# Patient Record
Sex: MALE | Race: WHITE | NOT HISPANIC OR LATINO | Employment: STUDENT | URBAN - METROPOLITAN AREA
[De-identification: names, ages, dates, MRNs, and addresses within clinical notes are randomized per-mention and may not be internally consistent; named-entity substitution may affect disease eponyms.]

---

## 2018-11-01 DIAGNOSIS — B07.9 VIRAL WARTS, UNSPECIFIED TYPE: Primary | ICD-10-CM

## 2024-12-21 ENCOUNTER — HOSPITAL ENCOUNTER (EMERGENCY)
Facility: HOSPITAL | Age: 29
Discharge: HOME | End: 2024-12-21
Attending: EMERGENCY MEDICINE
Payer: COMMERCIAL

## 2024-12-21 VITALS
OXYGEN SATURATION: 98 % | SYSTOLIC BLOOD PRESSURE: 177 MMHG | TEMPERATURE: 100 F | DIASTOLIC BLOOD PRESSURE: 91 MMHG | WEIGHT: 203.69 LBS | BODY MASS INDEX: 28.52 KG/M2 | RESPIRATION RATE: 16 BRPM | HEART RATE: 88 BPM | HEIGHT: 71 IN

## 2024-12-21 DIAGNOSIS — T14.8XXA PUNCTURE WOUND: Primary | ICD-10-CM

## 2024-12-21 DIAGNOSIS — M79.5 FOREIGN BODY (FB) IN SOFT TISSUE: ICD-10-CM

## 2024-12-21 PROCEDURE — 99284 EMERGENCY DEPT VISIT MOD MDM: CPT | Mod: 25

## 2024-12-21 PROCEDURE — 90471 IMMUNIZATION ADMIN: CPT | Performed by: NURSE PRACTITIONER

## 2024-12-21 PROCEDURE — 90715 TDAP VACCINE 7 YRS/> IM: CPT | Performed by: NURSE PRACTITIONER

## 2024-12-21 PROCEDURE — 63600175 PHARM REV CODE 636 W HCPCS: Performed by: NURSE PRACTITIONER

## 2024-12-21 RX ORDER — CEPHALEXIN 500 MG/1
500 CAPSULE ORAL 4 TIMES DAILY
Qty: 20 CAPSULE | Refills: 0 | Status: SHIPPED | OUTPATIENT
Start: 2024-12-21 | End: 2024-12-26

## 2024-12-21 RX ORDER — LIDOCAINE HYDROCHLORIDE 10 MG/ML
10 INJECTION, SOLUTION EPIDURAL; INFILTRATION; INTRACAUDAL; PERINEURAL
Status: COMPLETED | OUTPATIENT
Start: 2024-12-21 | End: 2024-12-21

## 2024-12-21 RX ADMIN — LIDOCAINE HYDROCHLORIDE 100 MG: 10 SOLUTION INTRAVENOUS at 03:12

## 2024-12-21 RX ADMIN — CLOSTRIDIUM TETANI TOXOID ANTIGEN (FORMALDEHYDE INACTIVATED), CORYNEBACTERIUM DIPHTHERIAE TOXOID ANTIGEN (FORMALDEHYDE INACTIVATED), BORDETELLA PERTUSSIS TOXOID ANTIGEN (GLUTARALDEHYDE INACTIVATED), BORDETELLA PERTUSSIS FILAMENTOUS HEMAGGLUTININ ANTIGEN (FORMALDEHYDE INACTIVATED), BORDETELLA PERTUSSIS PERTACTIN ANTIGEN, AND BORDETELLA PERTUSSIS FIMBRIAE 2/3 ANTIGEN 0.5 ML: 5; 2; 2.5; 5; 3; 5 INJECTION, SUSPENSION INTRAMUSCULAR at 04:12

## 2024-12-22 NOTE — ED PROVIDER NOTES
Encounter Date: 12/21/2024       History     Chief Complaint   Patient presents with    Foreign Body in Skin     Pt was at work moving wood and got a large piece of wood in right hand today.     29-year-old male presents the emergency department for a foreign body noted to the right hand.  Patient reports he was working with a piece of plywood when a piece of splinter came off and punctured to the skin of his right hand.  Last Tdap is unknown.  Denies any fever, chills, chest pain, shortness of breath, back pain, abdominal pain, nausea, vomiting, and all other concerns at this time.    The history is provided by the patient. No  was used.     Review of patient's allergies indicates:  No Known Allergies  History reviewed. No pertinent past medical history.  Past Surgical History:   Procedure Laterality Date    APPENDECTOMY  10/2010     Family History   Problem Relation Name Age of Onset    Diabetes Maternal Grandmother      Diabetes Paternal Grandmother      Breast cancer Paternal Grandmother      Allergies Paternal Grandmother      Diabetes Paternal Grandfather      Myasthenia gravis Maternal Grandfather      Allergies Father      Allergies Brother      Asthma Maternal Uncle      Eczema Paternal Aunt      Allergic rhinitis Neg Hx      Angioedema Neg Hx      Atopy Neg Hx      Immunodeficiency Neg Hx      Rhinitis Neg Hx      Urticaria Neg Hx       Social History     Tobacco Use    Smoking status: Never     Review of Systems   Constitutional:  Negative for fever.   HENT:  Negative for sore throat.    Respiratory:  Negative for shortness of breath.    Cardiovascular:  Negative for chest pain.   Gastrointestinal:  Negative for abdominal pain, nausea and vomiting.   Genitourinary:  Negative for dysuria.   Musculoskeletal:  Negative for back pain.   Skin:  Positive for wound. Negative for rash.   Neurological:  Negative for weakness.   Hematological:  Does not bruise/bleed easily.       Physical Exam  "    Initial Vitals [12/21/24 1440]   BP Pulse Resp Temp SpO2   (!) 177/91 88 16 99.7 °F (37.6 °C) 98 %      MAP       --         Physical Exam    Nursing note and vitals reviewed.  Constitutional: He appears well-developed and well-nourished. He is not diaphoretic. No distress.   HENT:   Head: Normocephalic and atraumatic.   Eyes: Right eye exhibits no discharge. Left eye exhibits no discharge.   Neck: Neck supple.   Normal range of motion.  Cardiovascular:  Normal rate.           Pulmonary/Chest: No respiratory distress.   Abdominal: He exhibits no distension.   Musculoskeletal:         General: Normal range of motion.      Cervical back: Normal range of motion and neck supple.     Neurological: He is alert and oriented to person, place, and time. He has normal strength.   Skin: Skin is warm and dry.   Pieces splintered would noted through the proximal palmar aspect of the right hand just distal to the wrist.   Psychiatric: He has a normal mood and affect. His behavior is normal. Thought content normal.         ED Course   Foreign Body    Date/Time: 12/21/2024 5:37 PM    Performed by: Ethan Champion NP  Authorized by: Ethan Champion NP  Consent Done: Yes  Consent: Verbal consent obtained. Written consent not obtained.  Risks and benefits: risks, benefits and alternatives were discussed  Consent given by: patient  Patient understanding: patient states understanding of the procedure being performed  Patient consent: the patient's understanding of the procedure matches consent given  Imaging studies: imaging studies available  Patient identity confirmed: name  Time out: Immediately prior to procedure a "time out" was called to verify the correct patient, procedure, equipment, support staff and site/side marked as required.  Intake: Right hand.  Anesthesia: local infiltration    Anesthesia:  Local Anesthetic: lidocaine 1% without epinephrine  Anesthetic total (ml): 5.    Patient sedated: no  Patient " cooperative: yes  Complexity: simple  Number of foreign bodies recovered: 1.  Foreign bodies recovered: Piece of splinter wood.  Post-procedure assessment: foreign body removed  Patient tolerance: Patient tolerated the procedure well with no immediate complications      Labs Reviewed - No data to display       Imaging Results              X-Ray Hand 3 view Right (Final result)  Result time 12/21/24 15:36:21      Final result by Aviva OritzMaverick), MD (12/21/24 15:36:21)                   Impression:      As above.      Electronically signed by: Aviva Ortiz MD  Date:    12/21/2024  Time:    15:36               Narrative:    EXAMINATION:  XR HAND COMPLETE 3 VIEW RIGHT    CLINICAL HISTORY:  Foreign body    TECHNIQUE:  Standard radiography performed.  Three views.    COMPARISON:  None    FINDINGS:  Bone density and architecture are normal.  Linear mildly density noted in the soft tissues along the a ulnar aspect of the hand near the 4th metacarpal could represent a radiopaque foreign body perhaps related enlarged within splinter.                                       Medications   Tdap vaccine injection 0.5 mL (0.5 mLs Intramuscular Given 12/21/24 1601)   LIDOcaine (PF) 10 mg/ml (1%) injection 100 mg (100 mg Infiltration Given by Provider 12/21/24 1500)     Medical Decision Making  Amount and/or Complexity of Data Reviewed  Radiology: ordered.    Risk  Prescription drug management.                                      Clinical Impression:  Final diagnoses:  [T14.8XXA] Puncture wound (Primary)  [M79.5] Foreign body (FB) in soft tissue          ED Disposition Condition    Discharge Stable          ED Prescriptions       Medication Sig Dispense Start Date End Date Auth. Provider    cephALEXin (KEFLEX) 500 MG capsule Take 1 capsule (500 mg total) by mouth 4 (four) times daily. for 5 days 20 capsule 12/21/2024 12/26/2024 Ethan Champion NP          Follow-up Information       Follow up With Specialties  Details Why Contact Info    pcp of choice   As needed     [facility] Emergency Dept. Emergency Medicine  If symptoms worsen [id number] [facility] [id number] [phone]             Ethan Champion, VIVIANE  12/21/24 6748